# Patient Record
Sex: MALE | Race: OTHER | HISPANIC OR LATINO | Employment: OTHER | URBAN - METROPOLITAN AREA
[De-identification: names, ages, dates, MRNs, and addresses within clinical notes are randomized per-mention and may not be internally consistent; named-entity substitution may affect disease eponyms.]

---

## 2017-08-11 NOTE — PROCEDURE NOTE: CLINICAL
PROCEDURE NOTE: Laser for Lattice Degeneration OS. Diagnosis: Lattice Degeneration of Retina. Anesthesia: Topical. Prior to laser, risks/benefits/alternatives to laser discussed including loss of vision, decreased peripheral and night vision, need for more laser and/or surgery and patient wished to proceed. An informed consent was obtained and no assurances or guarantees were given. Spot size: 200* um. Power: 600* mW. Pulse duration: 100* ms. Number:[id number] *. Procedure Time: 1211PM*. Patient tolerated procedure well. There were no complications. Post procedure instructions given. Patient given office phone number/answering service number and advised to call immediately should there be loss of vision or pain, or should they have any other questions or concerns. Mirian Huffman

## 2018-07-13 ENCOUNTER — NEW REFERRAL (OUTPATIENT)
Dept: URBAN - METROPOLITAN AREA CLINIC 33 | Facility: CLINIC | Age: 68
End: 2018-07-13

## 2018-07-13 VITALS
HEIGHT: 67 IN | SYSTOLIC BLOOD PRESSURE: 133 MMHG | DIASTOLIC BLOOD PRESSURE: 78 MMHG | BODY MASS INDEX: 27.78 KG/M2 | WEIGHT: 177 LBS | HEART RATE: 61 BPM

## 2018-07-13 DIAGNOSIS — H43.392: ICD-10-CM

## 2018-07-13 DIAGNOSIS — H35.373: ICD-10-CM

## 2018-07-13 DIAGNOSIS — H43.811: ICD-10-CM

## 2018-07-13 DIAGNOSIS — H33.301: ICD-10-CM

## 2018-07-13 PROCEDURE — 92134 CPTRZ OPH DX IMG PST SGM RTA: CPT

## 2018-07-13 PROCEDURE — 92225 OPHTHALMOSCOPY (INITIAL): CPT

## 2018-07-13 PROCEDURE — 99204 OFFICE O/P NEW MOD 45 MIN: CPT

## 2018-07-13 PROCEDURE — 67145 PROPH RTA DTCHMNT PC: CPT

## 2018-07-13 ASSESSMENT — TONOMETRY
OS_IOP_MMHG: 15
OD_IOP_MMHG: 13

## 2018-07-13 ASSESSMENT — VISUAL ACUITY
OS_PH: 20/25
OD_SC: 20/25-1
OS_SC: 20/50

## 2018-08-17 ENCOUNTER — POST-OP CHECK (OUTPATIENT)
Dept: URBAN - METROPOLITAN AREA CLINIC 33 | Facility: CLINIC | Age: 68
End: 2018-08-17

## 2018-08-17 VITALS — HEIGHT: 60 IN | HEART RATE: 86 BPM | SYSTOLIC BLOOD PRESSURE: 142 MMHG | DIASTOLIC BLOOD PRESSURE: 81 MMHG

## 2018-08-17 DIAGNOSIS — H35.373: ICD-10-CM

## 2018-08-17 DIAGNOSIS — H43.811: ICD-10-CM

## 2018-08-17 DIAGNOSIS — H33.301: ICD-10-CM

## 2018-08-17 PROCEDURE — 92250 FUNDUS PHOTOGRAPHY W/I&R: CPT

## 2018-08-17 PROCEDURE — 99024 POSTOP FOLLOW-UP VISIT: CPT

## 2018-08-17 ASSESSMENT — TONOMETRY
OS_IOP_MMHG: 16
OD_IOP_MMHG: 18

## 2018-08-17 ASSESSMENT — VISUAL ACUITY
OS_CC: 20/40
OD_CC: 20/25-1
OS_PH: 20/25-2

## 2019-06-28 ENCOUNTER — FOLLOW UP (OUTPATIENT)
Dept: URBAN - METROPOLITAN AREA CLINIC 33 | Facility: CLINIC | Age: 69
End: 2019-06-28

## 2019-06-28 VITALS — BODY MASS INDEX: 28.13 KG/M2 | HEIGHT: 66 IN | WEIGHT: 175 LBS

## 2019-06-28 DIAGNOSIS — H33.301: ICD-10-CM

## 2019-06-28 DIAGNOSIS — H43.811: ICD-10-CM

## 2019-06-28 DIAGNOSIS — H43.392: ICD-10-CM

## 2019-06-28 DIAGNOSIS — H35.373: ICD-10-CM

## 2019-06-28 PROCEDURE — 92014 COMPRE OPH EXAM EST PT 1/>: CPT

## 2019-06-28 PROCEDURE — 92134 CPTRZ OPH DX IMG PST SGM RTA: CPT

## 2019-06-28 ASSESSMENT — VISUAL ACUITY
OD_SC: 20/30-2
OS_SC: 20/40-1

## 2019-06-28 ASSESSMENT — TONOMETRY
OS_IOP_MMHG: 19
OD_IOP_MMHG: 17

## 2020-09-25 ENCOUNTER — FOLLOW UP (OUTPATIENT)
Dept: URBAN - METROPOLITAN AREA CLINIC 33 | Facility: CLINIC | Age: 70
End: 2020-09-25

## 2020-09-25 VITALS — HEIGHT: 66 IN | WEIGHT: 179 LBS | BODY MASS INDEX: 28.77 KG/M2

## 2020-09-25 DIAGNOSIS — H02.836: ICD-10-CM

## 2020-09-25 DIAGNOSIS — H25.12: ICD-10-CM

## 2020-09-25 DIAGNOSIS — H43.392: ICD-10-CM

## 2020-09-25 DIAGNOSIS — H43.811: ICD-10-CM

## 2020-09-25 DIAGNOSIS — H33.301: ICD-10-CM

## 2020-09-25 DIAGNOSIS — H04.123: ICD-10-CM

## 2020-09-25 DIAGNOSIS — H02.833: ICD-10-CM

## 2020-09-25 DIAGNOSIS — H35.373: ICD-10-CM

## 2020-09-25 PROCEDURE — 92134 CPTRZ OPH DX IMG PST SGM RTA: CPT

## 2020-09-25 PROCEDURE — 92250 FUNDUS PHOTOGRAPHY W/I&R: CPT

## 2020-09-25 PROCEDURE — 92014 COMPRE OPH EXAM EST PT 1/>: CPT

## 2020-09-25 ASSESSMENT — TONOMETRY
OS_IOP_MMHG: 16
OD_IOP_MMHG: 15

## 2020-09-25 ASSESSMENT — VISUAL ACUITY
OD_SC: 20/40
OS_SC: 20/40

## 2020-11-25 ENCOUNTER — APPOINTMENT (RX ONLY)
Dept: URBAN - METROPOLITAN AREA CLINIC 124 | Facility: CLINIC | Age: 70
Setting detail: DERMATOLOGY
End: 2020-11-25

## 2020-11-25 DIAGNOSIS — L0391 CELLULITIS AND ABSCESS OF UNSPECIFIED SITES: ICD-10-CM

## 2020-11-25 DIAGNOSIS — L0390 CELLULITIS AND ABSCESS OF UNSPECIFIED SITES: ICD-10-CM

## 2020-11-25 PROBLEM — L02.212 CUTANEOUS ABSCESS OF BACK [ANY PART, EXCEPT BUTTOCK]: Status: ACTIVE | Noted: 2020-11-25

## 2020-11-25 PROCEDURE — 10060 I&D ABSCESS SIMPLE/SINGLE: CPT

## 2020-11-25 PROCEDURE — ? INCISION AND DRAINAGE

## 2020-11-25 PROCEDURE — ? COUNSELING

## 2020-11-25 ASSESSMENT — LOCATION ZONE DERM: LOCATION ZONE: TRUNK

## 2020-11-25 ASSESSMENT — LOCATION DETAILED DESCRIPTION DERM: LOCATION DETAILED: SUPERIOR LUMBAR SPINE

## 2020-11-25 ASSESSMENT — LOCATION SIMPLE DESCRIPTION DERM: LOCATION SIMPLE: LOWER BACK

## 2020-11-25 NOTE — PROCEDURE: INCISION AND DRAINAGE
Detail Level: Simple
Lesion Type: Cyst
Method: 11 blade
Curette: No
Anesthesia Type: 1% lidocaine with epinephrine
Anesthesia Volume In Cc: 0.9
Size Of Lesion In Cm (Optional But May Be Required For Some Insurances): 0
Drainage Amount?: minimal
Wound Care: Petrolatum
Dressing: dry sterile dressing
Epidermal Sutures: 4-0 Prolene
Epidermal Closure: simple interrupted
Suture Text: Cyst wall was completely removed prior to closure.
Preparation Text: The area was prepped in the usual clean fashion.
Curette Text (Optional): After the contents were expressed a curette was used to partially remove the cyst wall.
Consent was obtained and risks were reviewed including but not limited to delayed wound healing, infection, need for multiple I and D's, and pain.
Post-Care Instructions: I reviewed with the patient in detail post-care instructions. Patient should keep wound covered and call the office should any redness, pain, swelling or worsening occur.

## 2021-10-22 ENCOUNTER — EST. PATIENT EMERGENCY (OUTPATIENT)
Dept: URBAN - METROPOLITAN AREA CLINIC 33 | Facility: CLINIC | Age: 71
End: 2021-10-22

## 2021-10-22 VITALS — WEIGHT: 175 LBS | BODY MASS INDEX: 28.13 KG/M2 | HEIGHT: 66 IN

## 2021-10-22 DIAGNOSIS — H43.811: ICD-10-CM

## 2021-10-22 DIAGNOSIS — H35.373: ICD-10-CM

## 2021-10-22 DIAGNOSIS — H04.123: ICD-10-CM

## 2021-10-22 DIAGNOSIS — H43.392: ICD-10-CM

## 2021-10-22 DIAGNOSIS — H33.301: ICD-10-CM

## 2021-10-22 PROCEDURE — 92250 FUNDUS PHOTOGRAPHY W/I&R: CPT

## 2021-10-22 PROCEDURE — 92014 COMPRE OPH EXAM EST PT 1/>: CPT

## 2021-10-22 PROCEDURE — 92134 CPTRZ OPH DX IMG PST SGM RTA: CPT

## 2021-10-22 ASSESSMENT — VISUAL ACUITY
OS_CC: 20/40-1
OD_CC: 20/40
OS_PH: 20/25-2

## 2021-10-22 ASSESSMENT — TONOMETRY
OS_IOP_MMHG: 21
OD_IOP_MMHG: 17
OS_IOP_MMHG: 19

## 2024-05-09 ENCOUNTER — COMPREHENSIVE EXAM (OUTPATIENT)
Dept: URBAN - METROPOLITAN AREA CLINIC 33 | Facility: CLINIC | Age: 74
End: 2024-05-09

## 2024-05-09 DIAGNOSIS — H04.123: ICD-10-CM

## 2024-05-09 DIAGNOSIS — H02.836: ICD-10-CM

## 2024-05-09 DIAGNOSIS — H33.301: ICD-10-CM

## 2024-05-09 DIAGNOSIS — H35.3121: ICD-10-CM

## 2024-05-09 DIAGNOSIS — H43.811: ICD-10-CM

## 2024-05-09 DIAGNOSIS — H02.833: ICD-10-CM

## 2024-05-09 DIAGNOSIS — H35.371: ICD-10-CM

## 2024-05-09 DIAGNOSIS — H43.392: ICD-10-CM

## 2024-05-09 DIAGNOSIS — H25.12: ICD-10-CM

## 2024-05-09 PROCEDURE — 92250 FUNDUS PHOTOGRAPHY W/I&R: CPT | Mod: 59

## 2024-05-09 PROCEDURE — 92014 COMPRE OPH EXAM EST PT 1/>: CPT

## 2024-05-09 PROCEDURE — 92134 CPTRZ OPH DX IMG PST SGM RTA: CPT

## 2024-05-09 ASSESSMENT — TONOMETRY
OS_IOP_MMHG: 14
OD_IOP_MMHG: 13

## 2024-05-09 ASSESSMENT — VISUAL ACUITY
OS_PH: 20/50
OD_SC: 20/40-2
OS_SC: 20/70-2